# Patient Record
(demographics unavailable — no encounter records)

---

## 2024-11-27 NOTE — HISTORY OF PRESENT ILLNESS
[FreeTextEntry6] : right ear pain  went to  in [place] away at school  gave ear drops for OE  still with pain to right ear / feels clogged  no fever  denies URI symptoms

## 2024-11-27 NOTE — DISCUSSION/SUMMARY
[FreeTextEntry1] : ear wax removed from ears b/l  using curette pt tolerated well  advised to use wax softner to right ear -1 hard piece  RTO 5 days to remove the rest if needed  also gave labs  advised to schedule WC during holiday breaks when home from College

## 2025-07-23 NOTE — DISCUSSION/SUMMARY
[Normal Growth] : growth [Normal Development] : development  [No Elimination Concerns] : elimination [Continue Regimen] : feeding [No Skin Concerns] : skin [Normal Sleep Pattern] : sleep [None] : no medical problems [Anticipatory Guidance Given] : Anticipatory guidance addressed as per the history of present illness section [No Vaccines] : no vaccines needed [No Medications] : ~He/She~ is not on any medications [Patient] : patient [Parent/Guardian] : Parent/Guardian [] : The components of the vaccine(s) to be administered today are listed in the plan of care. The disease(s) for which the vaccine(s) are intended to prevent and the risks have been discussed with the caretaker.  The risks are also included in the appropriate vaccination information statements which have been provided to the patient's caregiver.  The caregiver has given consent to vaccinate. [FreeTextEntry1] :  seen by cardio bc maternal uncle  of HCM - needs to f/u for echo - d/w mom and oliver importance of following up labs ordered - mom requesting STI testing, oliver agrees,  in dec, will repeat  Discussed and counseled on components of 5-2-1-0, healthy active living with patient and family.  Recommended 5 servings of fruits and vegetables per day, less than 2 hours of screen time per day, 1 hour of exercise per day, and 0 sugar sweetened beverages

## 2025-07-23 NOTE — HISTORY OF PRESENT ILLNESS
[Yes] : Patient goes to dentist yearly [No] : No cigarette smoke exposure [FreeTextEntry1] : no issues/concerns eats well, good variety going to [employer] - walks a lot plans on going to the gym doing well in school +dentist  nml urine/bm headss mostly neg social marijuana +SA - uses condoms